# Patient Record
Sex: FEMALE | Race: WHITE
[De-identification: names, ages, dates, MRNs, and addresses within clinical notes are randomized per-mention and may not be internally consistent; named-entity substitution may affect disease eponyms.]

---

## 2020-11-08 ENCOUNTER — HOSPITAL ENCOUNTER (INPATIENT)
Dept: HOSPITAL 92 - ERS | Age: 65
LOS: 5 days | Discharge: HOME | DRG: 247 | End: 2020-11-13
Attending: INTERNAL MEDICINE | Admitting: INTERNAL MEDICINE
Payer: COMMERCIAL

## 2020-11-08 VITALS — BODY MASS INDEX: 32 KG/M2

## 2020-11-08 DIAGNOSIS — K21.9: ICD-10-CM

## 2020-11-08 DIAGNOSIS — I44.7: ICD-10-CM

## 2020-11-08 DIAGNOSIS — I21.09: Primary | ICD-10-CM

## 2020-11-08 DIAGNOSIS — I42.9: ICD-10-CM

## 2020-11-08 DIAGNOSIS — I47.2: ICD-10-CM

## 2020-11-08 DIAGNOSIS — I49.3: ICD-10-CM

## 2020-11-08 DIAGNOSIS — Z87.891: ICD-10-CM

## 2020-11-08 DIAGNOSIS — I25.10: ICD-10-CM

## 2020-11-08 DIAGNOSIS — E66.9: ICD-10-CM

## 2020-11-08 DIAGNOSIS — K76.1: ICD-10-CM

## 2020-11-08 DIAGNOSIS — I10: ICD-10-CM

## 2020-11-08 DIAGNOSIS — Z82.49: ICD-10-CM

## 2020-11-08 DIAGNOSIS — E78.00: ICD-10-CM

## 2020-11-08 DIAGNOSIS — Z20.828: ICD-10-CM

## 2020-11-08 DIAGNOSIS — Z89.512: ICD-10-CM

## 2020-11-08 LAB
ALBUMIN SERPL BCG-MCNC: 3.8 G/DL (ref 3.4–4.8)
ALP SERPL-CCNC: 115 U/L (ref 40–110)
ALT SERPL W P-5'-P-CCNC: 66 U/L (ref 8–55)
ANION GAP SERPL CALC-SCNC: 18 MMOL/L (ref 10–20)
APTT PPP: 100.5 SEC (ref 22.9–36.1)
AST SERPL-CCNC: 41 U/L (ref 5–34)
BILIRUB SERPL-MCNC: 0.4 MG/DL (ref 0.2–1.2)
BUN SERPL-MCNC: 17 MG/DL (ref 9.8–20.1)
CALCIUM SERPL-MCNC: 8.9 MG/DL (ref 7.8–10.44)
CHLORIDE SERPL-SCNC: 103 MMOL/L (ref 98–107)
CK MB SERPL-MCNC: 1.5 NG/ML (ref 0–6.6)
CK MB SERPL-MCNC: 477 NG/ML (ref 0–6.6)
CK SERPL-CCNC: 32 U/L (ref 29–168)
CO2 SERPL-SCNC: 23 MMOL/L (ref 23–31)
CREAT CL PREDICTED SERPL C-G-VRATE: 0 ML/MIN (ref 70–130)
GLOBULIN SER CALC-MCNC: 3 G/DL (ref 2.4–3.5)
GLUCOSE SERPL-MCNC: 130 MG/DL (ref 80–115)
HGB BLD-MCNC: 15.1 G/DL (ref 12–16)
INR PPP: 1.1
MCH RBC QN AUTO: 31 PG (ref 27–31)
MCV RBC AUTO: 93.2 FL (ref 78–98)
MDIFF COMPLETE?: YES
PLATELET # BLD AUTO: 205 THOU/UL (ref 130–400)
POTASSIUM SERPL-SCNC: 3.4 MMOL/L (ref 3.5–5.1)
PROTHROMBIN TIME: 14.1 SEC (ref 12–14.7)
RBC # BLD AUTO: 4.87 MILL/UL (ref 4.2–5.4)
SODIUM SERPL-SCNC: 141 MMOL/L (ref 136–145)
TROPONIN I SERPL DL<=0.01 NG/ML-MCNC: 176.09 NG/ML (ref ?–0.03)
WBC # BLD AUTO: 13.7 THOU/UL (ref 4.8–10.8)

## 2020-11-08 PROCEDURE — 90732 PPSV23 VACC 2 YRS+ SUBQ/IM: CPT

## 2020-11-08 PROCEDURE — 84484 ASSAY OF TROPONIN QUANT: CPT

## 2020-11-08 PROCEDURE — 82553 CREATINE MB FRACTION: CPT

## 2020-11-08 PROCEDURE — 71045 X-RAY EXAM CHEST 1 VIEW: CPT

## 2020-11-08 PROCEDURE — 96365 THER/PROPH/DIAG IV INF INIT: CPT

## 2020-11-08 PROCEDURE — 93010 ELECTROCARDIOGRAM REPORT: CPT

## 2020-11-08 PROCEDURE — 02C03ZZ EXTIRPATION OF MATTER FROM CORONARY ARTERY, ONE ARTERY, PERCUTANEOUS APPROACH: ICD-10-PCS | Performed by: INTERNAL MEDICINE

## 2020-11-08 PROCEDURE — 85730 THROMBOPLASTIN TIME PARTIAL: CPT

## 2020-11-08 PROCEDURE — 90662 IIV NO PRSV INCREASED AG IM: CPT

## 2020-11-08 PROCEDURE — 027034Z DILATION OF CORONARY ARTERY, ONE ARTERY WITH DRUG-ELUTING INTRALUMINAL DEVICE, PERCUTANEOUS APPROACH: ICD-10-PCS | Performed by: INTERNAL MEDICINE

## 2020-11-08 PROCEDURE — 92928 PRQ TCAT PLMT NTRAC ST 1 LES: CPT

## 2020-11-08 PROCEDURE — 85610 PROTHROMBIN TIME: CPT

## 2020-11-08 PROCEDURE — 82550 ASSAY OF CK (CPK): CPT

## 2020-11-08 PROCEDURE — C9600 PERC DRUG-EL COR STENT SING: HCPCS

## 2020-11-08 PROCEDURE — 80061 LIPID PANEL: CPT

## 2020-11-08 PROCEDURE — C1874 STENT, COATED/COV W/DEL SYS: HCPCS

## 2020-11-08 PROCEDURE — U0003 INFECTIOUS AGENT DETECTION BY NUCLEIC ACID (DNA OR RNA); SEVERE ACUTE RESPIRATORY SYNDROME CORONAVIRUS 2 (SARS-COV-2) (CORONAVIRUS DISEASE [COVID-19]), AMPLIFIED PROBE TECHNIQUE, MAKING USE OF HIGH THROUGHPUT TECHNOLOGIES AS DESCRIBED BY CMS-2020-01-R: HCPCS

## 2020-11-08 PROCEDURE — 97139 UNLISTED THERAPEUTIC PX: CPT

## 2020-11-08 PROCEDURE — B2111ZZ FLUOROSCOPY OF MULTIPLE CORONARY ARTERIES USING LOW OSMOLAR CONTRAST: ICD-10-PCS | Performed by: INTERNAL MEDICINE

## 2020-11-08 PROCEDURE — 83880 ASSAY OF NATRIURETIC PEPTIDE: CPT

## 2020-11-08 PROCEDURE — 80053 COMPREHEN METABOLIC PANEL: CPT

## 2020-11-08 PROCEDURE — G0009 ADMIN PNEUMOCOCCAL VACCINE: HCPCS

## 2020-11-08 PROCEDURE — C1757 CATH, THROMBECTOMY/EMBOLECT: HCPCS

## 2020-11-08 PROCEDURE — G0008 ADMIN INFLUENZA VIRUS VAC: HCPCS

## 2020-11-08 PROCEDURE — 96375 TX/PRO/DX INJ NEW DRUG ADDON: CPT

## 2020-11-08 PROCEDURE — 96374 THER/PROPH/DIAG INJ IV PUSH: CPT

## 2020-11-08 PROCEDURE — 93005 ELECTROCARDIOGRAM TRACING: CPT

## 2020-11-08 PROCEDURE — 90471 IMMUNIZATION ADMIN: CPT

## 2020-11-08 PROCEDURE — 93454 CORONARY ARTERY ANGIO S&I: CPT

## 2020-11-08 PROCEDURE — 36415 COLL VENOUS BLD VENIPUNCTURE: CPT

## 2020-11-08 PROCEDURE — 93306 TTE W/DOPPLER COMPLETE: CPT

## 2020-11-08 PROCEDURE — 85347 COAGULATION TIME ACTIVATED: CPT

## 2020-11-08 PROCEDURE — 93798 PHYS/QHP OP CAR RHAB W/ECG: CPT

## 2020-11-08 PROCEDURE — 85025 COMPLETE CBC W/AUTO DIFF WBC: CPT

## 2020-11-08 PROCEDURE — 83036 HEMOGLOBIN GLYCOSYLATED A1C: CPT

## 2020-11-08 PROCEDURE — 92973 PRQ TRLUML C MCHN ASP THRMBC: CPT

## 2020-11-08 PROCEDURE — 87635 SARS-COV-2 COVID-19 AMP PRB: CPT
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
## 2020-11-08 NOTE — RAD
RADIOGRAPH CHEST 1 VIEW:



DATE:

11/8/2020



HISTORY:

65-year-old female with chest pain



FINDINGS:

The thoracic aorta is tortuous and ectatic. There is no evidence of airspace density, pulmonary edema
, or pneumothorax. The lateral costophrenic angles are not effaced.



IMPRESSION:

1) No acute pulmonary findings.

2) ectasia of thoracic aorta.



Reported By: Torito Alegre 

Electronically Signed:  11/8/2020 2:28 PM

## 2020-11-09 LAB
ALBUMIN SERPL BCG-MCNC: 3.5 G/DL (ref 3.4–4.8)
ALP SERPL-CCNC: 101 U/L (ref 40–110)
ALT SERPL W P-5'-P-CCNC: 105 U/L (ref 8–55)
ANION GAP SERPL CALC-SCNC: 14 MMOL/L (ref 10–20)
AST SERPL-CCNC: 280 U/L (ref 5–34)
BASOPHILS # BLD AUTO: 0 THOU/UL (ref 0–0.2)
BASOPHILS NFR BLD AUTO: 0.2 % (ref 0–1)
BILIRUB SERPL-MCNC: 0.4 MG/DL (ref 0.2–1.2)
BUN SERPL-MCNC: 13 MG/DL (ref 9.8–20.1)
CALCIUM SERPL-MCNC: 7.9 MG/DL (ref 7.8–10.44)
CHD RISK SERPL-RTO: 6.1 (ref ?–4.5)
CHLORIDE SERPL-SCNC: 105 MMOL/L (ref 98–107)
CHOLEST SERPL-MCNC: 236 MG/DL
CK MB SERPL-MCNC: 112.6 NG/ML (ref 0–6.6)
CK MB SERPL-MCNC: 230.3 NG/ML (ref 0–6.6)
CO2 SERPL-SCNC: 27 MMOL/L (ref 23–31)
COMM CRITICAL RESULTS DOC: (no result)
COMM CRITICAL RESULTS DOC: (no result)
CREAT CL PREDICTED SERPL C-G-VRATE: 0 ML/MIN (ref 70–130)
EOSINOPHIL # BLD AUTO: 0 THOU/UL (ref 0–0.7)
EOSINOPHIL NFR BLD AUTO: 0 % (ref 0–10)
GLOBULIN SER CALC-MCNC: 2.7 G/DL (ref 2.4–3.5)
GLUCOSE SERPL-MCNC: 127 MG/DL (ref 80–115)
HDLC SERPL-MCNC: 39 MG/DL
HGB BLD-MCNC: 14.4 G/DL (ref 12–16)
LDLC SERPL CALC-MCNC: 172 MG/DL
LYMPHOCYTES # BLD: 1.8 THOU/UL (ref 1.2–3.4)
LYMPHOCYTES NFR BLD AUTO: 13.6 % (ref 21–51)
MCH RBC QN AUTO: 31.3 PG (ref 27–31)
MCV RBC AUTO: 92.9 FL (ref 78–98)
MONOCYTES # BLD AUTO: 0.6 THOU/UL (ref 0.11–0.59)
MONOCYTES NFR BLD AUTO: 4.6 % (ref 0–10)
NEUTROPHILS # BLD AUTO: 10.5 THOU/UL (ref 1.4–6.5)
NEUTROPHILS NFR BLD AUTO: 81.6 % (ref 42–75)
PLATELET # BLD AUTO: 268 THOU/UL (ref 130–400)
POTASSIUM SERPL-SCNC: 4 MMOL/L (ref 3.5–5.1)
RBC # BLD AUTO: 4.6 MILL/UL (ref 4.2–5.4)
SODIUM SERPL-SCNC: 142 MMOL/L (ref 136–145)
TRIGL SERPL-MCNC: 126 MG/DL (ref ?–150)
TROPONIN I SERPL DL<=0.01 NG/ML-MCNC: (no result) NG/ML (ref ?–0.03)
TROPONIN I SERPL DL<=0.01 NG/ML-MCNC: 109.67 NG/ML (ref ?–0.03)
WBC # BLD AUTO: 12.9 THOU/UL (ref 4.8–10.8)

## 2020-11-09 RX ADMIN — ASPIRIN 81 MG CHEWABLE TABLET SCH: 81 TABLET CHEWABLE at 10:44

## 2020-11-10 LAB
ALBUMIN SERPL BCG-MCNC: 3.4 G/DL (ref 3.4–4.8)
ALP SERPL-CCNC: 93 U/L (ref 40–110)
ALT SERPL W P-5'-P-CCNC: 78 U/L (ref 8–55)
ANION GAP SERPL CALC-SCNC: 11 MMOL/L (ref 10–20)
AST SERPL-CCNC: 108 U/L (ref 5–34)
BILIRUB SERPL-MCNC: 0.6 MG/DL (ref 0.2–1.2)
BUN SERPL-MCNC: 11 MG/DL (ref 9.8–20.1)
CALCIUM SERPL-MCNC: 8.2 MG/DL (ref 7.8–10.44)
CHLORIDE SERPL-SCNC: 105 MMOL/L (ref 98–107)
CO2 SERPL-SCNC: 27 MMOL/L (ref 23–31)
CREAT CL PREDICTED SERPL C-G-VRATE: 128 ML/MIN (ref 70–130)
GLOBULIN SER CALC-MCNC: 2.6 G/DL (ref 2.4–3.5)
GLUCOSE SERPL-MCNC: 128 MG/DL (ref 80–115)
POTASSIUM SERPL-SCNC: 4 MMOL/L (ref 3.5–5.1)
SODIUM SERPL-SCNC: 139 MMOL/L (ref 136–145)

## 2020-11-10 RX ADMIN — ASPIRIN 81 MG CHEWABLE TABLET SCH MG: 81 TABLET CHEWABLE at 08:38

## 2020-11-11 LAB
ALBUMIN SERPL BCG-MCNC: 3.2 G/DL (ref 3.4–4.8)
ALP SERPL-CCNC: 89 U/L (ref 40–110)
ALT SERPL W P-5'-P-CCNC: 62 U/L (ref 8–55)
ANION GAP SERPL CALC-SCNC: 12 MMOL/L (ref 10–20)
AST SERPL-CCNC: 64 U/L (ref 5–34)
BILIRUB SERPL-MCNC: 0.6 MG/DL (ref 0.2–1.2)
BUN SERPL-MCNC: 11 MG/DL (ref 9.8–20.1)
CALCIUM SERPL-MCNC: 8.1 MG/DL (ref 7.8–10.44)
CHLORIDE SERPL-SCNC: 104 MMOL/L (ref 98–107)
CO2 SERPL-SCNC: 29 MMOL/L (ref 23–31)
CREAT CL PREDICTED SERPL C-G-VRATE: 139 ML/MIN (ref 70–130)
GLOBULIN SER CALC-MCNC: 2.6 G/DL (ref 2.4–3.5)
GLUCOSE SERPL-MCNC: 106 MG/DL (ref 80–115)
POTASSIUM SERPL-SCNC: 3.5 MMOL/L (ref 3.5–5.1)
SODIUM SERPL-SCNC: 141 MMOL/L (ref 136–145)

## 2020-11-11 RX ADMIN — ASPIRIN 81 MG CHEWABLE TABLET SCH MG: 81 TABLET CHEWABLE at 09:09

## 2020-11-12 LAB
ALBUMIN SERPL BCG-MCNC: 3.2 G/DL (ref 3.4–4.8)
ALP SERPL-CCNC: 100 U/L (ref 40–110)
ALT SERPL W P-5'-P-CCNC: 62 U/L (ref 8–55)
ANION GAP SERPL CALC-SCNC: 11 MMOL/L (ref 10–20)
AST SERPL-CCNC: 52 U/L (ref 5–34)
BILIRUB SERPL-MCNC: 0.6 MG/DL (ref 0.2–1.2)
BUN SERPL-MCNC: 13 MG/DL (ref 9.8–20.1)
CALCIUM SERPL-MCNC: 8.4 MG/DL (ref 7.8–10.44)
CHLORIDE SERPL-SCNC: 103 MMOL/L (ref 98–107)
CO2 SERPL-SCNC: 29 MMOL/L (ref 23–31)
CREAT CL PREDICTED SERPL C-G-VRATE: 126 ML/MIN (ref 70–130)
GLOBULIN SER CALC-MCNC: 2.6 G/DL (ref 2.4–3.5)
GLUCOSE SERPL-MCNC: 101 MG/DL (ref 80–115)
POTASSIUM SERPL-SCNC: 3.8 MMOL/L (ref 3.5–5.1)
SODIUM SERPL-SCNC: 139 MMOL/L (ref 136–145)

## 2020-11-12 RX ADMIN — Medication SCH ML: at 22:13

## 2020-11-12 RX ADMIN — Medication SCH ML: at 09:13

## 2020-11-12 RX ADMIN — ASPIRIN 81 MG CHEWABLE TABLET SCH MG: 81 TABLET CHEWABLE at 09:11
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
## 2020-11-13 VITALS — SYSTOLIC BLOOD PRESSURE: 156 MMHG | DIASTOLIC BLOOD PRESSURE: 82 MMHG | TEMPERATURE: 97.9 F

## 2020-11-13 LAB
ALBUMIN SERPL BCG-MCNC: 3.3 G/DL (ref 3.4–4.8)
ALP SERPL-CCNC: 103 U/L (ref 40–110)
ALT SERPL W P-5'-P-CCNC: 58 U/L (ref 8–55)
ANION GAP SERPL CALC-SCNC: 14 MMOL/L (ref 10–20)
AST SERPL-CCNC: 40 U/L (ref 5–34)
BILIRUB SERPL-MCNC: 0.6 MG/DL (ref 0.2–1.2)
BUN SERPL-MCNC: 12 MG/DL (ref 9.8–20.1)
CALCIUM SERPL-MCNC: 8.5 MG/DL (ref 7.8–10.44)
CHLORIDE SERPL-SCNC: 104 MMOL/L (ref 98–107)
CO2 SERPL-SCNC: 27 MMOL/L (ref 23–31)
CREAT CL PREDICTED SERPL C-G-VRATE: 130 ML/MIN (ref 70–130)
GLOBULIN SER CALC-MCNC: 2.8 G/DL (ref 2.4–3.5)
GLUCOSE SERPL-MCNC: 101 MG/DL (ref 80–115)
POTASSIUM SERPL-SCNC: 3.8 MMOL/L (ref 3.5–5.1)
SODIUM SERPL-SCNC: 141 MMOL/L (ref 136–145)

## 2020-11-13 RX ADMIN — ASPIRIN 81 MG CHEWABLE TABLET SCH MG: 81 TABLET CHEWABLE at 09:16

## 2020-11-13 RX ADMIN — Medication SCH ML: at 09:16
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
## 2020-11-13 NOTE — EKG
Test Reason : 

Blood Pressure : ***/*** mmHG

Vent. Rate : 065 BPM     Atrial Rate : 065 BPM

   P-R Int : 168 ms          QRS Dur : 082 ms

    QT Int : 484 ms       P-R-T Axes : 040 -41 192 degrees

   QTc Int : 503 ms

 

Normal sinus rhythm with sinus arrhythmia

Left axis deviation

Inferior infarct (cited on or before 08-NOV-2020)

Anterolateral infarct (cited on or before 08-NOV-2020)

Abnormal ECG

Confirmed by ARLYN BARRETO MD ([id number]) on 11/13/2020 8:21:19 AM

 

Referred By:  JUAN           Confirmed By:ARLYN BARRETO MD
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
## 2020-11-15 NOTE — EKG
Test Reason : 

Blood Pressure : ***/*** mmHG

Vent. Rate : 079 BPM     Atrial Rate : 092 BPM

   P-R Int : 000 ms          QRS Dur : 148 ms

    QT Int : 462 ms       P-R-T Axes : 000 149 -14 degrees

   QTc Int : 529 ms

 

Undetermined rhythm

Non-specific intra-ventricular conduction block

Lateral infarct , age undetermined

Inferior infarct , age undetermined

Abnormal ECG

No previous ECGs available

Confirmed by DANIEL VILLAVICENCIO ([id number]) on 11/15/2020 8:57:56 PM

 

Referred By:             Confirmed By:DANIEL VILLAVICENCIO

## 2020-11-17 NOTE — EKG
Test Reason : 

Blood Pressure : ***/*** mmHG

Vent. Rate : 074 BPM     Atrial Rate : 074 BPM

   P-R Int : 156 ms          QRS Dur : 080 ms

    QT Int : 412 ms       P-R-T Axes : 044 -42 086 degrees

   QTc Int : 457 ms

 

Normal sinus rhythm

Left axis deviation

Inferior infarct (cited on or before 08-NOV-2020)

Anterior infarct (cited on or before 08-NOV-2020)

T wave abnormality, consider lateral ischemia

Abnormal ECG

Confirmed by ARLYN BARRETO MD ([id number]) on 11/17/2020 2:20:25 AM

 

Referred By:  TONJA           Confirmed By:ARLYN BARRETO MD

## 2020-11-21 NOTE — EKG
Test Reason : 

Blood Pressure : ***/*** mmHG

Vent. Rate : 074 BPM     Atrial Rate : 074 BPM

   P-R Int : 174 ms          QRS Dur : 088 ms

    QT Int : 408 ms       P-R-T Axes : 065 -25 058 degrees

   QTc Int : 452 ms

 

Normal sinus rhythm with sinus arrhythmia

Left ventricular hypertrophy with repolarization abnormality

Inferior infarct , possibly acute

Anterolateral injury pattern

** ** ** ** * ACUTE MI * ** ** ** **

Consider right ventricular involvement in acute inferior infarct

Abnormal ECG

 

Confirmed by FABIAN PALM, ARIA ([id number]),  DARSHANA HEWITT ([id number]) on 11/21/2020 5:50:38 PM

 

Referred By:             Confirmed By:ARIA PERALTA MD

## 2021-01-07 ENCOUNTER — HOSPITAL ENCOUNTER (OUTPATIENT)
Dept: HOSPITAL 92 - BICMAMMO | Age: 66
Discharge: HOME | End: 2021-01-07
Attending: PHYSICIAN ASSISTANT
Payer: COMMERCIAL

## 2021-01-07 DIAGNOSIS — Z12.31: Primary | ICD-10-CM

## 2021-01-07 DIAGNOSIS — Z91.89: ICD-10-CM

## 2021-01-07 PROCEDURE — 77067 SCR MAMMO BI INCL CAD: CPT

## 2021-01-07 PROCEDURE — 77063 BREAST TOMOSYNTHESIS BI: CPT

## 2021-01-07 NOTE — MMO
Bilateral MAMMO Bilat Screen [facility].

 

CLINICAL HISTORY:

Patient is 65 years old and is seen for screening. The patient has no family

history of breast cancer.  The patient has no personal history of cancer. The

patient has a history of right needle biopsy - benign.

 

VIEWS:

The views performed were:  bilateral craniocaudal with tomosynthesis and

bilateral mediolateral oblique with tomosynthesis.

 

This study has been interpreted with the assistance of computer-aided detection.

 

MAMMOGRAM FINDINGS:

There are scattered fibroglandular densities.

 

Righr subaerolar nodule is stable.

 

There are no suspicious masses, suspicious calcifications, or new areas of

architectural distortion.

 

IMPRESSION:

THERE IS NO MAMMOGRAPHIC EVIDENCE OF MALIGNANCY.

 

A ROUTINE FOLLOW-UP MAMMOGRAM IN 1 YEAR IS RECOMMENDED.

 

THE RESULTS OF THIS EXAM WERE SENT TO THE PATIENT.

 

ACR BI-RADS Category 2 - Benign finding

 

MAMMOGRAPHY NOTE:

 1. A negative mammogram report should not delay a biopsy if a dominant of

 clinically suspicious mass is present.

 2. Approximately 10% to 15% of breast cancers are not detected by

 mammography.

 3. Adenosis and dense breasts may obscure an underlying neoplasm.

 

 

Reported by: JOSE WALKER MD

Electonically Signed: [id number]

## 2025-01-22 ENCOUNTER — HOSPITAL ENCOUNTER (INPATIENT)
Dept: HOSPITAL 92 - ERS | Age: 70
LOS: 2 days | Discharge: HOME | DRG: 399 | End: 2025-01-24
Attending: SURGERY | Admitting: SURGERY
Payer: SELF-PAY

## 2025-01-22 VITALS — BODY MASS INDEX: 31.9 KG/M2

## 2025-01-22 DIAGNOSIS — Z79.899: ICD-10-CM

## 2025-01-22 DIAGNOSIS — Z87.891: ICD-10-CM

## 2025-01-22 DIAGNOSIS — I50.9: ICD-10-CM

## 2025-01-22 DIAGNOSIS — K35.80: Primary | ICD-10-CM

## 2025-01-22 DIAGNOSIS — I25.2: ICD-10-CM

## 2025-01-22 DIAGNOSIS — Z79.82: ICD-10-CM

## 2025-01-22 LAB
ALBUMIN SERPL BCG-MCNC: 3.6 G/DL (ref 3.1–4.5)
ALP SERPL-CCNC: 95 U/L (ref 40–110)
ALT SERPL W P-5'-P-CCNC: 28 U/L (ref ?–34)
ANION GAP SERPL CALC-SCNC: 15 MMOL/L (ref 10–20)
AST SERPL-CCNC: 22 U/L (ref 11–34)
BASOPHILS # BLD AUTO: 0.04 10X3/UL (ref 0–0.2)
BASOPHILS NFR BLD AUTO: 0.2 % (ref 0–1)
BILIRUB SERPL-MCNC: 1 MG/DL (ref 0.3–1.2)
BUN SERPL-MCNC: 11 MG/DL (ref 9.8–20.1)
CALCIUM SERPL-MCNC: 8.7 MG/DL (ref 7.8–10.44)
CHLORIDE SERPL-SCNC: 98 MMOL/L (ref 98–107)
CO2 SERPL-SCNC: 28 MMOL/L (ref 23–31)
CREAT CL PREDICTED SERPL C-G-VRATE: 0 ML/MIN (ref 70–130)
EOSINOPHIL # BLD AUTO: (no result) 10X3/UL (ref 0–0.7)
EOSINOPHIL NFR BLD AUTO: 0 % (ref 0–10)
GLOBULIN SER CALC-MCNC: 3.2 G/DL (ref 2.4–3.5)
GLUCOSE SERPL-MCNC: 159 MG/DL (ref 80–115)
HCT VFR BLD CALC: 45.7 % (ref 36–47)
HGB BLD-MCNC: 15.5 G/DL (ref 12–16)
LIPASE SERPL-CCNC: 11 U/L (ref 8–78)
LYMPHOCYTES NFR BLD AUTO: 12.1 % (ref 21–51)
MCH RBC QN AUTO: 30.5 PG (ref 27–31)
MCV RBC AUTO: 90 FL (ref 78–98)
MONOCYTES # BLD AUTO: 1 10X3/UL (ref 0.11–0.59)
MONOCYTES NFR BLD AUTO: 5.5 % (ref 0–10)
NEUTROPHILS # BLD AUTO: 14.2 10X3/UL (ref 1.4–6.5)
NEUTROPHILS NFR BLD AUTO: 81.7 % (ref 42–75)
PLATELET # BLD AUTO: 260 10X3/UL (ref 130–400)
POTASSIUM SERPL-SCNC: 3.9 MMOL/L (ref 3.5–5.1)
RBC # BLD AUTO: 5.08 MILL/UL (ref 4.2–5.4)
SODIUM SERPL-SCNC: 137 MMOL/L (ref 136–145)
WBC # BLD AUTO: 17.3 10X3/UL (ref 4.8–10.8)

## 2025-01-22 PROCEDURE — 36415 COLL VENOUS BLD VENIPUNCTURE: CPT

## 2025-01-22 PROCEDURE — 80053 COMPREHEN METABOLIC PANEL: CPT

## 2025-01-22 PROCEDURE — 85025 COMPLETE CBC W/AUTO DIFF WBC: CPT

## 2025-01-22 PROCEDURE — 74177 CT ABD & PELVIS W/CONTRAST: CPT

## 2025-01-22 PROCEDURE — 83690 ASSAY OF LIPASE: CPT

## 2025-01-22 PROCEDURE — 83605 ASSAY OF LACTIC ACID: CPT

## 2025-01-22 PROCEDURE — 3E033XZ INTRODUCTION OF VASOPRESSOR INTO PERIPHERAL VEIN, PERCUTANEOUS APPROACH: ICD-10-PCS | Performed by: SURGERY

## 2025-01-22 PROCEDURE — 93005 ELECTROCARDIOGRAM TRACING: CPT

## 2025-01-22 PROCEDURE — 96376 TX/PRO/DX INJ SAME DRUG ADON: CPT

## 2025-01-22 PROCEDURE — 96365 THER/PROPH/DIAG IV INF INIT: CPT

## 2025-01-22 PROCEDURE — 0DTJ4ZZ RESECTION OF APPENDIX, PERCUTANEOUS ENDOSCOPIC APPROACH: ICD-10-PCS | Performed by: SURGERY

## 2025-01-22 PROCEDURE — 80048 BASIC METABOLIC PNL TOTAL CA: CPT

## 2025-01-22 PROCEDURE — 88304 TISSUE EXAM BY PATHOLOGIST: CPT

## 2025-01-22 PROCEDURE — 96375 TX/PRO/DX INJ NEW DRUG ADDON: CPT

## 2025-01-22 RX ADMIN — POTASSIUM CHLORIDE, DEXTROSE MONOHYDRATE AND SODIUM CHLORIDE SCH: 150; 5; 450 INJECTION, SOLUTION INTRAVENOUS at 13:21

## 2025-01-22 RX ADMIN — INFLUENZA A VIRUS A/VICTORIA/4897/2022 IVR-238 (H1N1) ANTIGEN (FORMALDEHYDE INACTIVATED), INFLUENZA A VIRUS A/THAILAND/8/2022 IVR-237 (H3N2) ANTIGEN (FORMALDEHYDE INACTIVATED), INFLUENZA B VIRUS B/AUSTRIA/1359417/2021 BVR-26 ANTIGEN (FORMALDEHYDE INACTIVATED) ONE: 15; 15; 15 INJECTION, SUSPENSION INTRAMUSCULAR at 13:55

## 2025-01-22 RX ADMIN — FAMOTIDINE SCH: 10 INJECTION, SOLUTION INTRAVENOUS at 19:59

## 2025-01-22 RX ADMIN — KETOROLAC TROMETHAMINE SCH MG: 30 INJECTION, SOLUTION INTRAMUSCULAR at 12:46

## 2025-01-23 LAB
ANION GAP SERPL CALC-SCNC: 11 MMOL/L (ref 10–20)
BASOPHILS # BLD AUTO: (no result) 10X3/UL (ref 0–0.2)
BASOPHILS NFR BLD AUTO: 0.1 % (ref 0–1)
BUN SERPL-MCNC: 18 MG/DL (ref 9.8–20.1)
CALCIUM SERPL-MCNC: 8.6 MG/DL (ref 7.8–10.44)
CHLORIDE SERPL-SCNC: 102 MMOL/L (ref 98–107)
CO2 SERPL-SCNC: 28 MMOL/L (ref 23–31)
CREAT CL PREDICTED SERPL C-G-VRATE: 88 ML/MIN (ref 70–130)
EOSINOPHIL # BLD AUTO: (no result) 10X3/UL (ref 0–0.7)
EOSINOPHIL NFR BLD AUTO: 0 % (ref 0–10)
GLUCOSE SERPL-MCNC: 132 MG/DL (ref 80–115)
HCT VFR BLD CALC: 38.7 % (ref 36–47)
HGB BLD-MCNC: 12.8 G/DL (ref 12–16)
LYMPHOCYTES NFR BLD AUTO: 7.2 % (ref 21–51)
MCH RBC QN AUTO: 31 PG (ref 27–31)
MCV RBC AUTO: 93.7 FL (ref 78–98)
MONOCYTES # BLD AUTO: 1 10X3/UL (ref 0.11–0.59)
MONOCYTES NFR BLD AUTO: 5.6 % (ref 0–10)
NEUTROPHILS # BLD AUTO: 14.7 10X3/UL (ref 1.4–6.5)
NEUTROPHILS NFR BLD AUTO: 86.4 % (ref 42–75)
PLATELET # BLD AUTO: 194 10X3/UL (ref 130–400)
POTASSIUM SERPL-SCNC: 4.2 MMOL/L (ref 3.5–5.1)
RBC # BLD AUTO: 4.13 MILL/UL (ref 4.2–5.4)
SODIUM SERPL-SCNC: 137 MMOL/L (ref 136–145)
WBC # BLD AUTO: 17 10X3/UL (ref 4.8–10.8)

## 2025-01-23 RX ADMIN — ENOXAPARIN SODIUM SCH MG: 100 INJECTION SUBCUTANEOUS at 10:36

## 2025-01-23 RX ADMIN — SACUBITRIL AND VALSARTAN SCH TAB: 49; 51 TABLET, FILM COATED ORAL at 21:25

## 2025-01-23 RX ADMIN — SACUBITRIL AND VALSARTAN SCH TAB: 49; 51 TABLET, FILM COATED ORAL at 13:04

## 2025-01-24 VITALS — TEMPERATURE: 97.4 F | SYSTOLIC BLOOD PRESSURE: 117 MMHG | DIASTOLIC BLOOD PRESSURE: 73 MMHG

## 2025-01-24 LAB
BASOPHILS # BLD AUTO: (no result) 10X3/UL (ref 0–0.2)
BASOPHILS NFR BLD AUTO: 0.1 % (ref 0–1)
EOSINOPHIL # BLD AUTO: (no result) 10X3/UL (ref 0–0.7)
EOSINOPHIL NFR BLD AUTO: 0.1 % (ref 0–10)
HCT VFR BLD CALC: 35.2 % (ref 36–47)
HGB BLD-MCNC: 11.8 G/DL (ref 12–16)
LYMPHOCYTES NFR BLD AUTO: 11.2 % (ref 21–51)
MCH RBC QN AUTO: 31.1 PG (ref 27–31)
MCV RBC AUTO: 92.9 FL (ref 78–98)
MONOCYTES # BLD AUTO: 0.7 10X3/UL (ref 0.11–0.59)
MONOCYTES NFR BLD AUTO: 5.7 % (ref 0–10)
NEUTROPHILS # BLD AUTO: 9.9 10X3/UL (ref 1.4–6.5)
NEUTROPHILS NFR BLD AUTO: 82.3 % (ref 42–75)
PLATELET # BLD AUTO: 201 10X3/UL (ref 130–400)
RBC # BLD AUTO: 3.79 MILL/UL (ref 4.2–5.4)
WBC # BLD AUTO: 12.1 10X3/UL (ref 4.8–10.8)